# Patient Record
Sex: FEMALE | Employment: UNEMPLOYED | URBAN - METROPOLITAN AREA
[De-identification: names, ages, dates, MRNs, and addresses within clinical notes are randomized per-mention and may not be internally consistent; named-entity substitution may affect disease eponyms.]

---

## 2021-08-27 ENCOUNTER — HOSPITAL ENCOUNTER (OUTPATIENT)
Age: 36
Discharge: HOME | End: 2021-08-27
Payer: COMMERCIAL

## 2021-08-27 VITALS
HEART RATE: 76 BPM | RESPIRATION RATE: 16 BRPM | SYSTOLIC BLOOD PRESSURE: 116 MMHG | DIASTOLIC BLOOD PRESSURE: 80 MMHG | OXYGEN SATURATION: 98 % | TEMPERATURE: 99 F

## 2021-08-27 DIAGNOSIS — Z20.822 EXPOSURE TO COVID-19 VIRUS: Primary | ICD-10-CM

## 2021-08-27 PROCEDURE — 99202 OFFICE O/P NEW SF 15 MIN: CPT | Performed by: NURSE PRACTITIONER

## 2021-08-28 NOTE — ED PROVIDER NOTES
Patient Seen in: Immediate [facility]      History   Patient presents with:  Covid-19 Test  Fatigue    Stated Complaint: COVID TEST    HPI/Subjective:   73-year-old female who presents the IC needing a Covid test.  Patient was supposed to her spouse 2 w CHEST/LUNGS: Clear to auscultation. There is no respiratory distress noted. HEART/CARDIOVASCULAR: Regular. There is no tachycardia. SKIN: There is no rash. NEURO: The patient is awake, alert, and oriented. The patient is cooperative.   ED Course

## 2021-08-28 NOTE — ED INITIAL ASSESSMENT (HPI)
Pt exposed to covid by family members. Spouse positive 2 weeks ago. Daughter positive last Monday. Pt started feeling fatigued today.

## 2021-08-29 LAB — SARS-COV-2 RNA RESP QL NAA+PROBE: NOT DETECTED
